# Patient Record
Sex: MALE | Race: WHITE | NOT HISPANIC OR LATINO | Employment: FULL TIME | URBAN - METROPOLITAN AREA
[De-identification: names, ages, dates, MRNs, and addresses within clinical notes are randomized per-mention and may not be internally consistent; named-entity substitution may affect disease eponyms.]

---

## 2017-04-12 ENCOUNTER — OFFICE VISIT (OUTPATIENT)
Dept: URGENT CARE | Facility: PHYSICIAN GROUP | Age: 37
End: 2017-04-12
Payer: COMMERCIAL

## 2017-04-12 VITALS
DIASTOLIC BLOOD PRESSURE: 80 MMHG | SYSTOLIC BLOOD PRESSURE: 110 MMHG | RESPIRATION RATE: 18 BRPM | WEIGHT: 215 LBS | HEART RATE: 92 BPM | OXYGEN SATURATION: 96 % | BODY MASS INDEX: 26.18 KG/M2 | TEMPERATURE: 98.9 F

## 2017-04-12 DIAGNOSIS — J20.9 ACUTE BRONCHITIS, UNSPECIFIED ORGANISM: ICD-10-CM

## 2017-04-12 DIAGNOSIS — L21.9 SEBORRHEIC DERMATITIS: ICD-10-CM

## 2017-04-12 DIAGNOSIS — J01.90 ACUTE NON-RECURRENT SINUSITIS, UNSPECIFIED LOCATION: ICD-10-CM

## 2017-04-12 PROCEDURE — 99204 OFFICE O/P NEW MOD 45 MIN: CPT | Performed by: FAMILY MEDICINE

## 2017-04-12 RX ORDER — SELENIUM SULFIDE 22.5 MG/ML
1 SHAMPOO TOPICAL
Qty: 1 BOTTLE | Refills: 0 | Status: SHIPPED | OUTPATIENT
Start: 2017-04-12

## 2017-04-12 RX ORDER — AMOXICILLIN AND CLAVULANATE POTASSIUM 875; 125 MG/1; MG/1
1 TABLET, FILM COATED ORAL 2 TIMES DAILY
Qty: 20 TAB | Refills: 0 | Status: SHIPPED | OUTPATIENT
Start: 2017-04-12 | End: 2017-04-22

## 2017-04-12 ASSESSMENT — ENCOUNTER SYMPTOMS
HEARTBURN: 0
PHOTOPHOBIA: 0
FEVER: 0
NAUSEA: 0
CHILLS: 0
WHEEZING: 1
HEADACHES: 0
DIZZINESS: 0
SPUTUM PRODUCTION: 0
COUGH: 1
INSOMNIA: 0
DOUBLE VISION: 0
SHORTNESS OF BREATH: 0
MYALGIAS: 1
SORE THROAT: 0

## 2017-04-12 NOTE — PROGRESS NOTES
Subjective:      Eran Leal is a 36 y.o. male who presents with Ear Fullness  patient describes 4-5 days of nasal congestion and sinus pressure but today he has developed left ear pain and pressure that has been intense. He denies any fevers or chills as had some fatigue he has been also coughing worse at night. Patient is a smoker. Denies any known history of seasonal allergies. He also has recently relocated to the area was prescribed a prescription strength selenium sulfide for seborrheic dermatitis of both the scalp and also the face which he has been out of for several months and was hoping to get a prescription.    Ear Fullness  Associated symptoms include coughing and myalgias. Pertinent negatives include no chest pain, chills, fever, headaches, nausea, rash or sore throat.     Review of Systems   Constitutional: Positive for malaise/fatigue. Negative for fever and chills.   HENT: Positive for ear pain. Negative for sore throat.    Eyes: Negative for double vision and photophobia.   Respiratory: Positive for cough and wheezing. Negative for sputum production and shortness of breath.    Cardiovascular: Negative for chest pain.   Gastrointestinal: Negative for heartburn and nausea.   Genitourinary: Negative.    Musculoskeletal: Positive for myalgias.   Skin: Negative for itching and rash.   Neurological: Negative for dizziness and headaches.   Endo/Heme/Allergies: Negative.    Psychiatric/Behavioral: The patient does not have insomnia.      PMH:  has no past medical history of GERD (gastroesophageal reflux disease), Ulcer (CMS-[facility]), or Allergy.  MEDS:   Current outpatient prescriptions:   •  Hydrocod Polst-CPM Polst ER (TUSSIONEX) 10-8 MG/5ML Suspension Extended Release, Take 5 mL by mouth 2 times a day as needed for Cough or Rhinitis. Will cause sedation, avoid driving, operating heavy machinery, and drinking alcohol, Disp: 60 mL, Rfl: 0  •  Selenium Sulfide 2.25 % Shampoo, 1 Application by Apply  externally route 1 time daily as needed (seborrhea)., Disp: 1 Bottle, Rfl: 0  •  amoxicillin-clavulanate (AUGMENTIN) 875-125 MG Tab, Take 1 Tab by mouth 2 times a day for 10 days. With food, Disp: 20 Tab, Rfl: 0  •  mometasone (NASONEX) 50 MCG/ACT nasal spray, Spray 2 Act in nose every day. Each nostril, Disp: 1 Inhaler, Rfl: 0  •  ondansetron (ZOFRAN) 4 MG TABS, Take 1 Tab by mouth every 8 hours as needed for Nausea/Vomiting., Disp: 20 Tab, Rfl: 0  ALLERGIES: No Known Allergies  SURGHX:   Past Surgical History   Procedure Laterality Date   • Inguinal hernia repair  9/5/08     Performed by YI SALAZAR at SURGERY SAME DAY [facility] ORS   SOCHX:  reports that he has been smoking Cigarettes.  He does not have any smokeless tobacco history on file.  FH: Family history was reviewed, no pertinent findings to report     Objective:     /80 mmHg  Pulse 92  Temp(Src) 37.2 °C (98.9 °F)  Resp 18  Wt 97.523 kg (215 lb)  SpO2 96%     Physical Exam   Constitutional: He is oriented to person, place, and time. He appears well-developed and well-nourished. No distress.   HENT:   Head:       Right Ear: Tympanic membrane is injected. A middle ear effusion is present.   Left Ear: Tympanic membrane is injected and erythematous. A middle ear effusion is present.   Mouth/Throat: Posterior oropharyngeal erythema present. No oropharyngeal exudate.   Loss of landmarks to the left tm    No sinus pain or pressure to ext palpation    Some dry skin present   Eyes: Conjunctivae are normal.   Neck: Normal range of motion.   Cardiovascular: Normal rate, regular rhythm, normal heart sounds and intact distal pulses.  Exam reveals no gallop and no friction rub.    No murmur heard.  Pulmonary/Chest: Effort normal. He has wheezes. He has no rales. He exhibits no tenderness.   Mild ronchi   Musculoskeletal: Normal range of motion.   Neurological: He is alert and oriented to person, place, and time.   Skin: Skin is warm and dry. Rash  noted. He is not diaphoretic. No erythema. No pallor.   Psychiatric: He has a normal mood and affect. His behavior is normal. Judgment and thought content normal.            Assessment/Plan:     1. Acute non-recurrent sinusitis, unspecified location    - amoxicillin-clavulanate (AUGMENTIN) 875-125 MG Tab; Take 1 Tab by mouth 2 times a day for 10 days. With food  Dispense: 20 Tab; Refill: 0    2. Acute bronchitis, unspecified organism  - Hydrocod Polst-CPM Polst ER (TUSSIONEX) 10-8 MG/5ML Suspension Extended Release; Take 5 mL by mouth 2 times a day as needed for Cough or Rhinitis. Will cause sedation, avoid driving, operating heavy machinery, and drinking alcohol  Dispense: 60 mL; Refill: 0  - amoxicillin-clavulanate (AUGMENTIN) 875-125 MG Tab; Take 1 Tab by mouth 2 times a day for 10 days. With food  Dispense: 20 Tab; Refill: 0    3. Seborrheic dermatitis    - Selenium Sulfide 2.25 % Shampoo; 1 Application by Apply externally route 1 time daily as needed (seborrhea).  Dispense: 1 Bottle; Refill: 0

## 2017-04-12 NOTE — MR AVS SNAPSHOT
Eran Leal   2017 10:45 AM   Office Visit   MRN: [id number]    Department:  [facility]   Dept Phone:  [phone]    Description:  Male : 1980   Provider:  Yamel Montgomery D.O.           Reason for Visit     Ear Fullness x4days stuffy/pressure B ears/ sore throat/      Allergies as of 2017     No Known Allergies      You were diagnosed with     Acute non-recurrent sinusitis, unspecified location   [2362443]       Acute bronchitis, unspecified organism   [[id number]]       Seborrheic dermatitis   [[id number]]         Vital Signs     Blood Pressure Pulse Temperature Respirations Weight Oxygen Saturation    110/80 mmHg 92 37.2 °C (98.9 °F) 18 97.523 kg (215 lb) 96%      Basic Information     Date Of Birth Sex Race Ethnicity Preferred Language    1980 Male White Non- English      Health Maintenance        Date Due Completion Dates    IMM DTaP/Tdap/Td Vaccine (1 - Tdap) 1997 ---            Current Immunizations     No immunizations on file.      Below and/or attached are the medications your provider expects you to take. Review all of your home medications and newly ordered medications with your provider and/or pharmacist. Follow medication instructions as directed by your provider and/or pharmacist. Please keep your medication list with you and share with your provider. Update the information when medications are discontinued, doses are changed, or new medications (including over-the-counter products) are added; and carry medication information at all times in the event of emergency situations     Allergies:  No Known Allergies          Medications  Valid as of: 2017 - 11:15 AM    Generic Name Brand Name Tablet Size Instructions for use    Hydrocod Polst-Chlorphen Polst (Suspension Extended Release) TUSSIONEX 10-8 MG/5ML Take 5 mL by mouth 2 times a day as needed for Cough or Rhinitis. Will cause sedation, avoid driving, operating heavy machinery, and  drinking alcohol        Mometasone Furoate (Suspension) NASONEX 50 MCG/ACT Spray 2 Act in nose every day. Each nostril        Ondansetron HCl (Tab) ZOFRAN 4 MG Take 1 Tab by mouth every 8 hours as needed for Nausea/Vomiting.        Selenium Sulf-Pyrithione-Urea (Shampoo) Selenium Sulfide 2.25 % 1 Application by Apply externally route 1 time daily as needed (seborrhea).        .                 Medicines prescribed today were sent to:     [id number] - [place], [place] - [address]    [address] [facility] [place] [address]    Phone: [phone] Fax: [phone]    Open 24 Hours?: No      Medication refill instructions:       If your prescription bottle indicates you have medication refills left, it is not necessary to call your provider’s office. Please contact your pharmacy and they will refill your medication.    If your prescription bottle indicates you do not have any refills left, you may request refills at any time through one of the following ways: The online [employer] system (except Urgent Care), by calling your provider’s office, or by asking your pharmacy to contact your provider’s office with a refill request. Medication refills are processed only during regular business hours and may not be available until the next business day. Your provider may request additional information or to have a follow-up visit with you prior to refilling your medication.   *Please Note: Medication refills are assigned a new Rx number when refilled electronically. Your pharmacy may indicate that no refills were authorized even though a new prescription for the same medication is available at the pharmacy. Please request the medicine by name with the pharmacy before contacting your provider for a refill.           [email or web address] Status: Patient Declined        Quit Tobacco Information     Do you want to quit using tobacco?    Quitting tobacco decreases risks of cancer, heart and lung disease, increases life expectancy, improves  sense of taste and smell, and increases spending money, among other benefits.    If you are thinking about quitting, we can help.  • [facility] Quit Tobacco Program: 286.321.6047  o Program occurs weekly for four weeks and includes pharmacist consultation on products to support quitting smoking or chewing tobacco. A provider referral is needed for pharmacist consultation.  • Tobacco Users Help Hotline: 7-033-QUIT-NOW (704-1914) or https://[place].quitlogix.org/  o Free, confidential telephone and online coaching for [place] residents. Sessions are designed on a schedule that is convenient for you. Eligible clients receive free nicotine replacement therapy.  • Nationally: www.smokefree.gov  o Information and professional assistance to support both immediate and long-term needs as you become, and remain, a non-smoker. Smokefree.gov allows you to choose the help that best fits your needs.